# Patient Record
Sex: MALE | Race: OTHER | Employment: UNEMPLOYED | URBAN - METROPOLITAN AREA
[De-identification: names, ages, dates, MRNs, and addresses within clinical notes are randomized per-mention and may not be internally consistent; named-entity substitution may affect disease eponyms.]

---
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
## 2024-01-22 NOTE — ED NOTES
Non-stick dressing applied to laceration. Discharge instructions reviewed with the patient and guardian with opportunity for questions given. The patient and guardian verbalized understanding. Patient armband removed and shredded. Patient in stable condition.
RW/independent